# Patient Record
(demographics unavailable — no encounter records)

---

## 2024-11-20 NOTE — HISTORY OF PRESENT ILLNESS
[de-identified] : had grown and gained weight appropriately has had abdominal pain in the mornings, recently improved has had normal tests after last visit 4/29/24including; CBC, CMP, ESR, CRP, with normal antigliadin IgG/A, antiendomysial; anti TTG IgA 20, which is slightly elevated redeveloped rash near buttocks bilaterally, right worse than left, that occurred at her celiac diagnosis, not painful, not weepy BM daily, bristol 3-4, no blood  4/2024 2 years prior had iron deficiency anemia, and was tested for celiac disease (mother has celiac disease), and all celiac panel was abnormal, saw Maxim Grewal, had upper endoscopy, mother was told pathology was consistent with celiac disease, and has been on gluten free diet ever since celiac antibodies (labs reviewed) have been improving over last 2 years, but have not yet normalized anemia has improved does not have symptoms, no abd pain, no diarrhea, gaining weight and growing well BM daily, no blood mother and mat uncle have celiac disease no family history of IBD, Crohn disease, Ulcerative Colitis

## 2024-11-20 NOTE — HISTORY OF PRESENT ILLNESS
[de-identified] : had grown and gained weight appropriately has had abdominal pain in the mornings, recently improved has had normal tests after last visit 4/29/24including; CBC, CMP, ESR, CRP, with normal antigliadin IgG/A, antiendomysial; anti TTG IgA 20, which is slightly elevated redeveloped rash near buttocks bilaterally, right worse than left, that occurred at her celiac diagnosis, not painful, not weepy BM daily, bristol 3-4, no blood  4/2024 2 years prior had iron deficiency anemia, and was tested for celiac disease (mother has celiac disease), and all celiac panel was abnormal, saw Maxim Grewal, had upper endoscopy, mother was told pathology was consistent with celiac disease, and has been on gluten free diet ever since celiac antibodies (labs reviewed) have been improving over last 2 years, but have not yet normalized anemia has improved does not have symptoms, no abd pain, no diarrhea, gaining weight and growing well BM daily, no blood mother and mat uncle have celiac disease no family history of IBD, Crohn disease, Ulcerative Colitis

## 2024-11-20 NOTE — ASSESSMENT
[FreeTextEntry1] : Radha was diagnosed with celiac disease (2022) after an upper endoscopy confirmed the diagnosis with biopsies of the small bowel. Her mother was asked to send me a copy of her endoscopy and pathology records. She has followed a gluten free diet, that she should continue, and is currently gaining weight and growing well without symptoms. I asked for her to send blood tests. She should return to office for follow up every 6-12 months, and as needed.  Plan discussed with mother of child who expressed her understanding and is agreeable to plan.

## 2024-11-20 NOTE — CONSULT LETTER
[Dear  ___] : Dear  [unfilled], [Consult Letter:] : I had the pleasure of evaluating your patient, [unfilled]. [Please see my note below.] : Please see my note below. [Consult Closing:] : Thank you very much for allowing me to participate in the care of this patient.  If you have any questions, please do not hesitate to contact me. [Sincerely,] : Sincerely, [FreeTextEntry3] : Bill Diaz MD MSc  Director, Pediatric Endoscopy Pediatric Gastroenterology and Nutrition [facility] [employer] [facility] at [facility] and [facility]imy Bustillo [facility]  Division of Pediatric Gastroenterology and Nutrition  [address], [address]  [place] [address]  [phone]

## 2024-11-20 NOTE — CONSULT LETTER
[Dear  ___] : Dear  [unfilled], [Consult Letter:] : I had the pleasure of evaluating your patient, [unfilled]. [Please see my note below.] : Please see my note below. [Consult Closing:] : Thank you very much for allowing me to participate in the care of this patient.  If you have any questions, please do not hesitate to contact me. [Sincerely,] : Sincerely, [FreeTextEntry3] : Bill Diaz MD MSc  Director, Pediatric Endoscopy Pediatric Gastroenterology and Nutrition [facility] [employer] [facility] at [facility] and Jeimy Bustillo [facility]  Division of Pediatric Gastroenterology and Nutrition  [address], [address]  [place] [address]  [phone]